# Patient Record
Sex: MALE | URBAN - METROPOLITAN AREA
[De-identification: names, ages, dates, MRNs, and addresses within clinical notes are randomized per-mention and may not be internally consistent; named-entity substitution may affect disease eponyms.]

---

## 2023-05-11 ENCOUNTER — APPOINTMENT (RX ONLY)
Dept: URBAN - METROPOLITAN AREA CLINIC 176 | Facility: CLINIC | Age: 58
Setting detail: DERMATOLOGY
End: 2023-05-11

## 2023-05-11 VITALS — HEIGHT: 73 IN | WEIGHT: 180 LBS

## 2023-05-11 VITALS — WEIGHT: 180 LBS | HEIGHT: 73 IN

## 2023-05-11 DIAGNOSIS — Z41.9 ENCOUNTER FOR PROCEDURE FOR PURPOSES OTHER THAN REMEDYING HEALTH STATE, UNSPECIFIED: ICD-10-CM

## 2023-05-11 PROCEDURE — ? JEUVEAU

## 2023-05-11 PROCEDURE — ? COSMETIC CONSULTATION: JEUVEAU

## 2023-05-11 PROCEDURE — ? ADDITIONAL NOTES

## 2023-05-11 NOTE — PROCEDURE: ADDITIONAL NOTES
Detail Level: Simple
Render Risk Assessment In Note?: yes
Additional Notes: Advised takes 2 weeks to get full benefit , patient has wedding in 1 week- patient aware may not have full benefit at that time- also explained lower forehead will not be treated  as do not want to drop brows

## 2023-05-11 NOTE — PROCEDURE: JEUVEAU
Detail Level: Detailed
Right Pupillary Line Units: 0
Show Forehead Units: Yes
Show Right And Left Periorbital Units: No
Consent: Written consent obtained. Risks include but not limited to lid/brow ptosis, bruising, swelling, diplopia, temporary effect, incomplete chemical denervation.
Dilution (U/0.1 Cc): 4
Periorbital Skin Units: 15
Expiration Date (Month Year): 5/2025
Post-Care Instructions: Patient instructed to not lie down for 4 hours and limit physical activity for 24 hours.
Forehead Units: 10
Price (Use Numbers Only, No Special Characters Or $): 864
Lot #: [id number]

## 2024-10-28 ENCOUNTER — APPOINTMENT (RX ONLY)
Dept: URBAN - METROPOLITAN AREA CLINIC 176 | Facility: CLINIC | Age: 59
Setting detail: DERMATOLOGY
End: 2024-10-28

## 2024-10-28 VITALS — HEIGHT: 73 IN | WEIGHT: 180 LBS

## 2024-10-28 DIAGNOSIS — Z41.9 ENCOUNTER FOR PROCEDURE FOR PURPOSES OTHER THAN REMEDYING HEALTH STATE, UNSPECIFIED: ICD-10-CM

## 2024-10-28 PROCEDURE — ? COSMETIC CONSULTATION: JEUVEAU

## 2024-10-28 PROCEDURE — ? JEUVEAU

## 2024-10-28 PROCEDURE — ? ADDITIONAL NOTES

## 2024-10-28 NOTE — PROCEDURE: JEUVEAU
Detail Level: Detailed
Right Pupillary Line Units: 0
Show Forehead Units: Yes
Show Right And Left Periorbital Units: No
Consent: Written consent obtained. Risks include but not limited to lid/brow ptosis, bruising, swelling, diplopia, temporary effect, incomplete chemical denervation.
Dilution (U/0.1 Cc): 4
Periorbital Skin Units: 15
Expiration Date (Month Year): 2/2027
Post-Care Instructions: Patient instructed to not lie down for 4 hours and limit physical activity for 24 hours.
Price (Use Numbers Only, No Special Characters Or $): 450
Glabellar Complex Units: 20
Lot #: [id number]